# Patient Record
Sex: MALE | Employment: UNEMPLOYED | URBAN - METROPOLITAN AREA
[De-identification: names, ages, dates, MRNs, and addresses within clinical notes are randomized per-mention and may not be internally consistent; named-entity substitution may affect disease eponyms.]

---

## 2019-12-03 ENCOUNTER — APPOINTMENT (OUTPATIENT)
Dept: VASCULAR LAB | Facility: MEDICAL CENTER | Age: 59
End: 2019-12-03
Attending: INTERNAL MEDICINE
Payer: OTHER GOVERNMENT

## 2019-12-03 NOTE — PROGRESS NOTES
[facility] HEPATITIS C TELECONFERENCE CLINIC NOTE     Date of Service: 12/2/2019    Referring Physician: VISHNU    Reason for Referral: Treatment for hepatitis C    Chief Complaint: Hepatitis C    History of Present Illness:     Sen Baez is a 59 y.o. male admitted (Not on file). ***    HCV genotype: 1b  HCV resistance: Unavailable   Prior treatment status: Naïve  Risk factors:  Cirrhosis: F3  CTP score: Class A  APRI score: 1.754  HCV PCR at start of treatment: 2,460,000 on 10/18/2019  HBV serologies: Nonimmune  HAV serology: Nonimmune  HIV Ab: Fourth-generation antibody screen positive, but antibody differentiation test as well as PCR negative    Review of Systems:  All other systems reviewed and are negative expect as noted in HPI    No past medical history on file.    No past surgical history on file.    Family history  Reviewed and not pertinent.      Social History     Socioeconomic History   • Marital status: Unknown     Spouse name: Not on file   • Number of children: Not on file   • Years of education: Not on file   • Highest education level: Not on file   Occupational History   • Not on file   Social Needs   • Financial resource strain: Not on file   • Food insecurity:     Worry: Not on file     Inability: Not on file   • Transportation needs:     Medical: Not on file     Non-medical: Not on file   Tobacco Use   • Smoking status: Not on file   Substance and Sexual Activity   • Alcohol use: Not on file   • Drug use: Not on file   • Sexual activity: Not on file   Lifestyle   • Physical activity:     Days per week: Not on file     Minutes per session: Not on file   • Stress: Not on file   Relationships   • Social connections:     Talks on phone: Not on file     Gets together: Not on file     Attends Catholic service: Not on file     Active member of club or organization: Not on file     Attends meetings of clubs or organizations: Not on file     Relationship status: Not on file   • Intimate partner  violence:     Fear of current or ex partner: Not on file     Emotionally abused: Not on file     Physically abused: Not on file     Forced sexual activity: Not on file   Other Topics Concern   • Not on file   Social History Narrative   • Not on file       Allergies not on file    Medications:  No current outpatient medications on file prior to visit.     No current facility-administered medications on file prior to visit.        Physical Exam:   This consultation was conducted utilizing secure and encrypted videoconferencing equipment with the assistance of a trained tele-presenter at the originating site.     Vital Signs: There were no vitals taken for this visit.  Vital signs reviewed  Constitutional: Patient is oriented to person, place, and time. Appears well-developed and well-nourished. No distress  Head: Atraumatic, normocephalic  Eyes: Conjunctivae normal, EOM intact. Pupils are equal, round, and reactive to light.   Mouth/Throat: Lips without lesions, good dentition, oropharynx is clear and moist.  Neck: Neck supple. No masses/lymphadenopathy  Cardiovascular: Normal rate, regular rhythm, normal S1S2 and intact distal pulses. No murmur, gallop, or friction rub. No pedal edema.  Pulmonary/Chest: No respiratory distress. Unlabored respiratory effort, lungs clear to auscultation. No wheezes or rales.   Abdominal: Soft, non tender. BS + x 4. No masses or hepatosplenomegaly.   Musculoskeletal: No joint tenderness, swelling, erythema, or restriction of motion noted.  Neurological: Alert and oriented to person, place, and time. No gross cranial nerve deficit. No focal neural deficit noted  Skin: Skin is warm and dry. No rashes or embolic phenomena noted on exposed skin  Psychiatric: Normal mood and affect. Behavior is normal.     LABS:  No results found for: WBC, RBC, HEMOGLOBIN, HEMATOCRIT, MCV, MCH, MCHC, MPV  No results found for: SODIUM, POTASSIUM, CHLORIDE, CO2, GLUCOSE, BUN, CREATININE, BUNCREATRAT,  GLOMRATE  No results found for: ALKPHOSPHAT, ASTSGOT, ALTSGPT, TBILIRUBIN   No results found for: CPKTOTAL     MICRO:  No results found for: BLOODCULTU, BLDCULT, BCHOLD      Latest pertinent labs were reviewed      Assessment:   Sen Baez is a 59 y.o. male with a history of ***    Pertinent Diagnoses:    Plan:   -Please repeat HIV antibody  -Please check liver ultrasound  -Recommend Twinrix vaccination series for hepatitis A and hepatitis B    Obed Glass M.D.    Please note that this dictation was created using voice recognition software. I have worked with technical experts from [facility], [facility] to optimize the interface.  I have made every reasonable attempt to correct obvious errors, but there may be errors of grammar and possibly content that I did not discover before finalizing the note.

## 2020-03-11 ENCOUNTER — HOSPITAL ENCOUNTER (OUTPATIENT)
Dept: RADIOLOGY | Facility: MEDICAL CENTER | Age: 60
End: 2020-03-11
Attending: FAMILY MEDICINE
Payer: OTHER GOVERNMENT

## 2020-03-11 DIAGNOSIS — B18.2 CHRONIC HEPATITIS C WITH HEPATIC COMA (HCC): ICD-10-CM

## 2020-03-11 PROCEDURE — 76700 US EXAM ABDOM COMPLETE: CPT

## 2020-03-12 ENCOUNTER — TELEMEDICINE2 (OUTPATIENT)
Dept: VASCULAR LAB | Facility: MEDICAL CENTER | Age: 60
End: 2020-03-12
Attending: INTERNAL MEDICINE
Payer: OTHER GOVERNMENT

## 2020-03-12 DIAGNOSIS — B19.20 COMPENSATED HCV CIRRHOSIS (HCC): ICD-10-CM

## 2020-03-12 DIAGNOSIS — I10 ESSENTIAL HYPERTENSION: ICD-10-CM

## 2020-03-12 DIAGNOSIS — K74.69 COMPENSATED HCV CIRRHOSIS (HCC): ICD-10-CM

## 2020-03-12 DIAGNOSIS — B18.2 CHRONIC HEPATITIS C WITHOUT HEPATIC COMA (HCC): ICD-10-CM

## 2020-03-12 PROCEDURE — 99204 OFFICE O/P NEW MOD 45 MIN: CPT | Performed by: INTERNAL MEDICINE

## 2020-03-12 RX ORDER — VELPATASVIR AND SOFOSBUVIR 100; 400 MG/1; MG/1
1 TABLET, FILM COATED ORAL DAILY
Qty: 84 TAB | Refills: 0 | Status: SHIPPED | OUTPATIENT
Start: 2020-03-12 | End: 2020-06-04

## 2020-03-12 NOTE — PROGRESS NOTES
[facility] HEPATITIS C TELECONFERENCE CLINIC NOTE     Date of Service: 3/12/2020    Referring Physician: VISHNU    Reason for Referral: Hepatitis C    Chief Complaint: Hepatitis C    History of Present Illness:     Sen Baez is a 59 y.o. male with known hypertension, hepatitis C, here for treatment of hepatitis C.  Patient states that he was diagnosed a few years ago on routine testing.  He notes no symptoms, no fatigue or abdominal pain.  States that he likely procured it from IV drug use in the 80s and 90s.  No fevers or chills.    HCV genotype: 1b  HCV resistance: Unknown  Prior treatment status: Naïve  Risk factors: IV drug use  Cirrhosis: F3  CTP score: A  HCV PCR at start of treatment: 1.32 million on 2/6/2020  HBV serologies: Nonimmune  HAV serology: Nonimmune  HIV Ab: Screening reactive, negative differentiation test and negative qualitative PCR    Review of Systems:  All other systems reviewed and are negative expect as noted in HPI    Past medical history  As above    Past surgical history  Appendectomy at age 21    Family history  Mother with kidney disease    Social History     Socioeconomic History   • Marital status: Unknown     Spouse name: Not on file   • Number of children: Not on file   • Years of education: Not on file   • Highest education level: Not on file   Occupational History   • Not on file   Social Needs   • Financial resource strain: Not on file   • Food insecurity     Worry: Not on file     Inability: Not on file   • Transportation needs     Medical: Not on file     Non-medical: Not on file   Tobacco Use   • Smoking status: Not on file   Substance and Sexual Activity   • Alcohol use: Not on file   • Drug use: Not on file   • Sexual activity: Not on file   Lifestyle   • Physical activity     Days per week: Not on file     Minutes per session: Not on file   • Stress: Not on file   Relationships   • Social connections     Talks on phone: Not on file     Gets together: Not on file      Attends Anglican service: Not on file     Active member of club or organization: Not on file     Attends meetings of clubs or organizations: Not on file     Relationship status: Not on file   • Intimate partner violence     Fear of current or ex partner: Not on file     Emotionally abused: Not on file     Physically abused: Not on file     Forced sexual activity: Not on file   Other Topics Concern   • Not on file   Social History Narrative   • Not on file   IV drug use in the 80s to 90s    Allergies   Allergen Reactions   • Penicillins      Swelling as a child, but was also stung by a bee before receiving penicillin  States may have received amoxicillin with dental procedures with no reaction       Medications:  Tylenol PRN  Atenolol  Flexeril PRN   PRN  Lisinopril    Physical Exam:   This consultation was conducted utilizing secure and encrypted videoconferencing equipment with the assistance of a trained tele-presenter at the originating site.     Vital Signs: /77 T 97.6 HR 73 RR 16 o2 96% Wt 170 lbs  Vital signs reviewed  Constitutional: Patient is oriented to person, place, and time. Appears thin and well-nourished. No distress  Head: Atraumatic, normocephalic  Eyes: Conjunctivae normal, EOM intact.   Mouth/Throat: Lips without lesions,    oropharynx is clear and moist.  Neck: Neck supple. No masses/lymphadenopathy  Cardiovascular: Normal rate, regular rhythm, normal S1S2 and intact distal pulses. No murmur, gallop, or friction rub. No pedal edema.  Pulmonary/Chest: No respiratory distress. Unlabored respiratory effort, lungs clear to auscultation. No wheezes or rales.   Abdominal: Soft, non tender. BS + x 4. No masses or hepatosplenomegaly.   Musculoskeletal: No joint tenderness, swelling, erythema, or restriction of motion noted.  Neurological: Alert and oriented to person, place, and time. No gross cranial nerve deficit.   Skin: Skin is warm and dry. No rashes or embolic phenomena noted on  exposed skin  Psychiatric: Pleasant. Normal mood and affect. Behavior is normal.     LABS:  No results found for: WBC, RBC, HEMOGLOBIN, HEMATOCRIT, MCV, MCH, MCHC, MPV  No results found for: SODIUM, POTASSIUM, CHLORIDE, CO2, GLUCOSE, BUN, CREATININE, BUNCREATRAT, GLOMRATE  No results found for: ALKPHOSPHAT, ASTSGOT, ALTSGPT, TBILIRUBIN   No results found for: CPKTOTAL     MICRO:  No results found for: BLOODCULTU, BLDCULT, BCHOLD      Latest pertinent labs were reviewed    IMAGING STUDIES:  No liver masses on ultrasound 3/11/2020    Assessment:   Sen Baez is a 59 y.o. male with a history of hypertension, hepatitis C, here for treatment    Pertinent Diagnoses:  Hepatitis C  Compensated HCV cirrhosis  Hypertension  False positive HIV screening test    Plan:   -Recommend hepatitis A and hepatitis B vaccine series  -HIV screening is consistent with a false positive test since both differentiation antibody test as well as PCR qualitative are negative.  We will go ahead and start treatment for hepatitis C but will repeat HIV screening test as well  -Repeat HIV screening    -Start PO Epclusa 1 tab daily x 12 weeks  -Medication interactions: None significant  -4 weeks from start of therapy: Recommend evaluation by facility care provider - to assess for tolerance and/or side effects. If any concerns such as fever, jaundice, scleral icterus, abdominal pain, abdominal distention, nausea, vomiting or diarrhea or other, then obtain CMP (note: A 10x increase in ALT at any time should prompt immediate discontinuation of therapy, if ALT rising then test q2 weeks) and notify [facility]  -At any time during therapy if there are concerning signs/symptoms then obtain the above labs and notify [facility]  -If NO concerns then continue treatment and no additional labs are needed until after treatment is complete   -12 weeks after COMPLETION of  therapy - obtain Hepatitis C virus (HCV) quantitative  PCR ([facility] test number [id number])  -Patients with cirrhosis will need hepatocellular carcinoma surveillance every 6 months with imaging +/- labs in accordance with AASLD guidelines    Obed Glass M.D.    Please note that this dictation was created using voice recognition software. I have worked with technical experts from [facility] to optimize the interface.  I have made every reasonable attempt to correct obvious errors, but there may be errors of grammar and possibly content that I did not discover before finalizing the note.